# Patient Record
Sex: FEMALE | Race: WHITE | NOT HISPANIC OR LATINO | URBAN - METROPOLITAN AREA
[De-identification: names, ages, dates, MRNs, and addresses within clinical notes are randomized per-mention and may not be internally consistent; named-entity substitution may affect disease eponyms.]

---

## 2021-01-28 ENCOUNTER — OFFICE VISIT (OUTPATIENT)
Dept: URGENT CARE | Facility: PHYSICIAN GROUP | Age: 36
End: 2021-01-28
Payer: COMMERCIAL

## 2021-01-28 ENCOUNTER — NURSE TRIAGE (OUTPATIENT)
Dept: HEALTH INFORMATION MANAGEMENT | Facility: OTHER | Age: 36
End: 2021-01-28

## 2021-01-28 VITALS
HEIGHT: 63 IN | DIASTOLIC BLOOD PRESSURE: 72 MMHG | SYSTOLIC BLOOD PRESSURE: 112 MMHG | BODY MASS INDEX: 21.65 KG/M2 | OXYGEN SATURATION: 98 % | WEIGHT: 122.2 LBS | HEART RATE: 88 BPM | TEMPERATURE: 98.7 F

## 2021-01-28 DIAGNOSIS — R07.89 OTHER CHEST PAIN: ICD-10-CM

## 2021-01-28 DIAGNOSIS — M54.12 CERVICAL RADICULOPATHY: ICD-10-CM

## 2021-01-28 PROCEDURE — 93000 ELECTROCARDIOGRAM COMPLETE: CPT | Performed by: FAMILY MEDICINE

## 2021-01-28 PROCEDURE — 99203 OFFICE O/P NEW LOW 30 MIN: CPT | Performed by: FAMILY MEDICINE

## 2021-01-28 ASSESSMENT — ENCOUNTER SYMPTOMS
TINGLING: 1
FOCAL WEAKNESS: 0
NECK PAIN: 1
FEVER: 0
SHORTNESS OF BREATH: 1
VOMITING: 0
SENSORY CHANGE: 1

## 2021-01-28 NOTE — TELEPHONE ENCOUNTER
[facility] insurance.      Runny nose since she moved here @ end of September 2020.      Coughed one time this morning to clear throat.      No travel.  No known covid exposure.      Reason for Disposition  • COVID-19 Testing, questions about    Additional Information  • Negative: SEVERE difficulty breathing (e.g., struggling for each breath, speaks in single words)  • Negative: Difficult to awaken or acting confused (e.g., disoriented, slurred speech)  • Negative: Bluish (or gray) lips or face now  • Negative: Shock suspected (e.g., cold/pale/clammy skin, too weak to stand, low BP, rapid pulse)  • Negative: Sounds like a life-threatening emergency to the triager  • Negative: [1] COVID-19 suspected (e.g., cough, fever, shortness of breath) AND [2] public health department recommends testing  • Negative: [1] COVID-19 exposure AND [2] no symptoms  • Negative: COVID-19 and Breastfeeding, questions about  • Negative: SEVERE or constant chest pain (Exception: mild central chest pain, present only when coughing)  • Negative: MODERATE difficulty breathing (e.g., speaks in phrases, SOB even at rest, pulse 100-120)  • Negative: MILD difficulty breathing (e.g., minimal/no SOB at rest, SOB with walking, pulse <100)  • Negative: Chest pain  • Negative: Patient sounds very sick or weak to the triager  • Negative: Fever > 103 F (39.4 C)  • Negative: [1] Fever > 101 F (38.3 C) AND [2] age > 60  • Negative: [1] Fever > 100.0 F (37.8 C) AND [2] bedridden (e.g., nursing home patient, CVA, chronic illness, recovering from surgery)  • Negative: HIGH RISK patient (e.g., age > 64 years, diabetes, heart or lung disease, weak immune system)  • Negative: Fever present > 3 days (72 hours)  • Negative: [1] Fever returns after gone for over 24 hours AND [2] symptoms worse or not improved  • Negative: [1] Continuous (nonstop) coughing interferes with work or school AND [2] no improvement using cough treatment per protocol  • Negative: Cough present >  "3 weeks    Answer Assessment - Initial Assessment Questions  1. COVID-19 DIAGNOSIS: \"Who made your Coronavirus (COVID-19) diagnosis?\" \"Was it confirmed by a positive lab test?\" If not diagnosed by a HCP, ask \"Are there lots of cases (community spread) where you live?\" (See public health department website, if unsure)    * MAJOR community spread: high number of cases; numbers of cases are increasing; many people hospitalized.    * MINOR community spread: low number of cases; not increasing; few or no people hospitalized      major  2. ONSET: \"When did the COVID-19 symptoms start?\"       2 a.m. this morning  3. WORST SYMPTOM: \"What is your worst symptom?\" (e.g., cough, fever, shortness of breath, muscle aches)      Tightness in chest & trouble breathing  4. COUGH: \"How bad is the cough?\"        No cough  5. FEVER: \"Do you have a fever?\" If so, ask: \"What is your temperature, how was it measured, and when did it start?\"      no  6. RESPIRATORY STATUS: \"Describe your breathing?\" (e.g., shortness of breath, wheezing, unable to speak)       When talking gets a little dizzy, SOB  7. BETTER-SAME-WORSE: \"Are you getting better, staying the same or getting worse compared to yesterday?\"  If getting worse, ask, \"In what way?\"      A little worse, more dizzy & having trouble breathing  8. HIGH RISK DISEASE: \"Do you have any chronic medical problems?\" (e.g., asthma, heart or lung disease, weak immune system, etc.)      No, arm has been falling asleep a lot for the last couple of months, maybe from carrying child  9. PREGNANCY: \"Is there any chance you are pregnant?\" \"When was your last menstrual period?\"      No, has IUD  10. OTHER SYMPTOMS: \"Do you have any other symptoms?\"  (e.g., runny nose, headache, sore throat, loss of smell)        Woke @ 2 a.m. with a stabbing pain in her chest, came & went, ASA suppressed it, felt again this morning when she woke up, coming & going but not as strong, right now feels tightness in chest & " trouble breathing    Protocols used: CORONAVIRUS (COVID-19) DIAGNOSED OR [id number]

## 2021-01-28 NOTE — TELEPHONE ENCOUNTER
Regarding: next course  ----- Message from Nadiya Hicks sent at 1/28/2021 11:42 AM PST -----  Chest pain and shortness of breath, chest tightness when breathing. next course of action

## 2021-01-28 NOTE — PROGRESS NOTES
"Subjective:     Brie Lovell is a 35 y.o. female who presents for Shortness of Breath (woke up this morning with SOB, felt like a heart attack. ), Chest Pain (able to go back to sleep. Felt tightness when she woke back up.), and Tingling (Right arm. )    HPI  Pt presents for evaluation of a new problem  Pt with chest pain which started this morning at 2am   Pain woke her up from sleep   Pain is on the left side   Pain is a stabbing type pain   Took some ASA and helped a little   Feeling SOB and fatigued all day   Chest and breathing are tight   No history of heart problems, asthma, or GERD    Having right arm numbness for the past few weeks, and slowly worsening   Numbness lasts a few minutes and self resolves   Worse at night when sleeping or when keeping arm above head to curl hair     Review of Systems   Constitutional: Positive for malaise/fatigue. Negative for fever.   Respiratory: Positive for shortness of breath.    Cardiovascular: Positive for chest pain.   Gastrointestinal: Negative for vomiting.   Musculoskeletal: Positive for neck pain.   Skin: Negative for rash.   Neurological: Positive for tingling and sensory change. Negative for focal weakness.     PMH: No chronic medical problems   MEDS: No daily meds   ALLERGIES: NKDA  SURGHX: None  SOCHX: No tobacco use   FH: Family history was reviewed, not contributing to acute illness      Objective:   /72 (BP Location: Left arm, Patient Position: Sitting, BP Cuff Size: Adult)   Pulse 88   Temp 37.1 °C (98.7 °F) (Temporal)   Ht 1.6 m (5' 3\")   Wt 55.4 kg (122 lb 3.2 oz)   SpO2 98%   BMI 21.65 kg/m²     Physical Exam  Constitutional:       General: She is not in acute distress.     Appearance: She is well-developed. She is not diaphoretic.   HENT:      Head: Normocephalic and atraumatic.   Cardiovascular:      Rate and Rhythm: Normal rate and regular rhythm.      Pulses: Normal pulses.   Pulmonary:      Effort: Pulmonary effort is normal. No " respiratory distress.      Breath sounds: No wheezing or rales.   Musculoskeletal:      Comments: Neck  General: No asymmetry, bruising, or erythema appreciated  ROM: FROM flex/extend/lateral bend/lateral rotation  Palpation: No TTP of spinous processes, no step-offs appreciated, +TTP of paraspinal muscles, no significant scoliosis appreciated  Special tests: Neg Spurling's  Neuro: Sensation intact and equal bilaterally in UE's, RUE strength 5/5 throughout, radial/ulnar/median nerve motor function intact  Vascular: Radial pulse 2+    Skin:     General: Skin is warm and dry.      Findings: No erythema.   Neurological:      Mental Status: She is alert and oriented to person, place, and time.   Psychiatric:         Behavior: Behavior normal.         Thought Content: Thought content normal.         Judgment: Judgment normal.       Assessment/Plan:   Assessment    1. Other chest pain  - EKG - Clinic Performed    Patient with chest pain which woke her up in the middle of the night last night. She continues to have a pleuritic type chest pain, shortness of breath, and feels very fatigued. Advised that differential could include pulmonary embolus, cardiac, or COVID-19. EKG normal sinus rhythm without significant ST changes, however advised that this only rules out certain pathology. Advised that appropriate work-up here in urgent care is limited. Unable to obtain appropriate blood work or imaging this late in the day and did advise patient be seen in emergency room. Patient is agreeable to this and will have her  drive her to [facility] ER for further work-up and evaluation.    2. Cervical radiculopathy  - REFERRAL TO FOLLOW-UP WITH PRIMARY CARE  - REFERRAL TO PHYSICAL THERAPY    Patient with cervical radiculopathy. Advised that physical therapy can be very helpful for this. Referral to PT made and also made referral to primary care as patient is new to [facility] and needs a PCP.

## 2021-01-31 ENCOUNTER — TELEPHONE (OUTPATIENT)
Dept: URGENT CARE | Facility: PHYSICIAN GROUP | Age: 36
End: 2021-01-31

## 2021-02-03 NOTE — TELEPHONE ENCOUNTER
Hello there,     There must be a mistake, because I did not try to call her.  It may have been someone else from [facility].  She was referred to primary care and physical therapy, perhaps it was one of them who tried to call her?  We had sent her to the emergency room at her visit.  It could also have been someone from the ER who was trying to contact her to follow-up.    -Dr. Tidwell